# Patient Record
Sex: FEMALE | Race: WHITE | Employment: FULL TIME | URBAN - METROPOLITAN AREA
[De-identification: names, ages, dates, MRNs, and addresses within clinical notes are randomized per-mention and may not be internally consistent; named-entity substitution may affect disease eponyms.]

---

## 2017-02-13 ENCOUNTER — TELEPHONE (OUTPATIENT)
Dept: DERMATOLOGY | Age: 32
End: 2017-02-13

## 2017-02-16 ENCOUNTER — OFFICE VISIT (OUTPATIENT)
Dept: DERMATOLOGY | Age: 32
End: 2017-02-16

## 2017-02-16 DIAGNOSIS — L70.0 ACNE VULGARIS: Primary | ICD-10-CM

## 2017-02-16 PROCEDURE — 99213 OFFICE O/P EST LOW 20 MIN: CPT | Performed by: DERMATOLOGY

## 2017-02-16 RX ORDER — DOXYCYCLINE HYCLATE 100 MG
TABLET ORAL
Qty: 60 TABLET | Refills: 3 | Status: SHIPPED | OUTPATIENT
Start: 2017-02-16 | End: 2017-04-18 | Stop reason: SDUPTHER

## 2017-02-16 RX ORDER — CLINDAMYCIN PHOSPHATE 11.9 MG/ML
SOLUTION TOPICAL
Qty: 60 ML | Refills: 4 | Status: SHIPPED | OUTPATIENT
Start: 2017-02-16 | End: 2017-12-05 | Stop reason: SDUPTHER

## 2017-02-23 ENCOUNTER — TELEPHONE (OUTPATIENT)
Dept: DERMATOLOGY | Age: 32
End: 2017-02-23

## 2017-04-18 ENCOUNTER — OFFICE VISIT (OUTPATIENT)
Dept: DERMATOLOGY | Age: 32
End: 2017-04-18

## 2017-04-18 DIAGNOSIS — L70.0 ACNE VULGARIS: Primary | ICD-10-CM

## 2017-04-18 PROCEDURE — 99213 OFFICE O/P EST LOW 20 MIN: CPT | Performed by: DERMATOLOGY

## 2017-04-18 RX ORDER — DOXYCYCLINE HYCLATE 100 MG
TABLET ORAL
Qty: 60 TABLET | Refills: 3 | Status: SHIPPED | OUTPATIENT
Start: 2017-04-18 | End: 2017-08-15 | Stop reason: SDUPTHER

## 2017-04-18 RX ORDER — NORETHINDRONE ACETATE AND ETHINYL ESTRADIOL, ETHINYL ESTRADIOL AND FERROUS FUMARATE 1MG-10(24)
KIT ORAL
COMMUNITY
Start: 2017-04-06 | End: 2020-10-27

## 2017-08-15 ENCOUNTER — OFFICE VISIT (OUTPATIENT)
Dept: DERMATOLOGY | Age: 32
End: 2017-08-15

## 2017-08-15 DIAGNOSIS — L70.0 ACNE VULGARIS: Primary | ICD-10-CM

## 2017-08-15 PROCEDURE — 99213 OFFICE O/P EST LOW 20 MIN: CPT | Performed by: DERMATOLOGY

## 2017-08-15 RX ORDER — DOXYCYCLINE HYCLATE 100 MG
TABLET ORAL
Qty: 60 TABLET | Refills: 3 | Status: SHIPPED | OUTPATIENT
Start: 2017-08-15 | End: 2017-12-05 | Stop reason: SDUPTHER

## 2017-12-05 ENCOUNTER — OFFICE VISIT (OUTPATIENT)
Dept: DERMATOLOGY | Age: 32
End: 2017-12-05

## 2017-12-05 DIAGNOSIS — L70.0 ACNE VULGARIS: Primary | ICD-10-CM

## 2017-12-05 DIAGNOSIS — L24.9 IRRITANT DERMATITIS: ICD-10-CM

## 2017-12-05 PROCEDURE — 99213 OFFICE O/P EST LOW 20 MIN: CPT | Performed by: DERMATOLOGY

## 2017-12-05 RX ORDER — CLINDAMYCIN PHOSPHATE 11.9 MG/ML
SOLUTION TOPICAL
Qty: 60 ML | Refills: 5 | Status: SHIPPED | OUTPATIENT
Start: 2017-12-05 | End: 2018-10-23 | Stop reason: SDUPTHER

## 2017-12-05 RX ORDER — DOXYCYCLINE HYCLATE 100 MG
TABLET ORAL
Qty: 60 TABLET | Refills: 5 | Status: SHIPPED | OUTPATIENT
Start: 2017-12-05 | End: 2018-05-01 | Stop reason: SDUPTHER

## 2017-12-05 NOTE — PROGRESS NOTES
[facility]) Dermatology  Nadia Bender M.D.  [phone]       [facility]  1985    27 y.o. female     Date of Visit: 12/5/2017    Chief Complaint:   Chief Complaint   Patient presents with    Follow-up     4 month follow up for acne         I was asked to see this patient by Dr. Crowe ref. provider found. History of Present Illness:  1. Patient today for follow-up of acne. She has been on doxycycline since February 2017, initially at 100 mg p.o. b.i.d. followed by 100 mg p.o. q. day. Did develop photo onycholysis of fingernails in September which resolved without treatment. Otherwise denies side effects. Has had good improvement of her acne-still has occasional outbreaks. Last week was on vacation and did not use her medicine consistently and did develop a flare. Uses clindamycin solution in the morning and tretinoin 0.025% cream q.h.s. Does wash with a benzoyl peroxide wash in the shower. Uses Cetaphil products    New erythema and scale with pruritus left dorsal fourth finger underneath wedding ring. Prior acne treatments: Minocycline, tretinoin, BenzaClin, clindamycin solution, Keflex     No personal history of skin cancer or dysplastic nevi.  Sister with an unknown type of skin cancer in [place]    Review of Systems:  Constitutional: Reports general sense of well-being       Past Medical History, Surgical History, Family History, Medications and Allergies reviewed. Social History:   Social History     Social History    Marital status:      Spouse name: N/A    Number of children: N/A    Years of education: N/A     Occupational History    Not on file.      Social History Main Topics    Smoking status: Former Smoker     Years: 5.00    Smokeless tobacco: Never Used    Alcohol use Not on file    Drug use: Unknown    Sexual activity: Not on file     Other Topics Concern    Not on file     Social History Narrative    No narrative on file       Physical Examination

## 2018-05-01 ENCOUNTER — OFFICE VISIT (OUTPATIENT)
Dept: DERMATOLOGY | Age: 33
End: 2018-05-01

## 2018-05-01 DIAGNOSIS — L70.0 ACNE VULGARIS: Primary | ICD-10-CM

## 2018-05-01 DIAGNOSIS — D22.9 BENIGN NEVUS: ICD-10-CM

## 2018-05-01 PROCEDURE — 99213 OFFICE O/P EST LOW 20 MIN: CPT | Performed by: DERMATOLOGY

## 2018-05-01 RX ORDER — DOXYCYCLINE HYCLATE 100 MG
TABLET ORAL
Qty: 60 TABLET | Refills: 5 | Status: SHIPPED | OUTPATIENT
Start: 2018-05-01 | End: 2018-05-01 | Stop reason: SDUPTHER

## 2018-05-01 RX ORDER — DOXYCYCLINE HYCLATE 100 MG
100 TABLET ORAL
COMMUNITY
End: 2018-10-23

## 2018-10-23 ENCOUNTER — OFFICE VISIT (OUTPATIENT)
Dept: DERMATOLOGY | Age: 33
End: 2018-10-23
Payer: COMMERCIAL

## 2018-10-23 DIAGNOSIS — L70.0 ACNE VULGARIS: Primary | ICD-10-CM

## 2018-10-23 PROCEDURE — 99213 OFFICE O/P EST LOW 20 MIN: CPT | Performed by: DERMATOLOGY

## 2018-10-23 RX ORDER — CLINDAMYCIN PHOSPHATE 11.9 MG/ML
SOLUTION TOPICAL
Qty: 60 ML | Refills: 5 | Status: SHIPPED | OUTPATIENT
Start: 2018-10-23 | End: 2020-10-27

## 2018-10-23 RX ORDER — PNV NO.118/IRON FUMARATE/FA 29 MG-1 MG
1 TABLET,CHEWABLE ORAL
COMMUNITY
End: 2020-10-27

## 2018-10-23 RX ORDER — CEPHALEXIN 500 MG/1
CAPSULE ORAL
Qty: 60 CAPSULE | Refills: 3 | Status: SHIPPED | OUTPATIENT
Start: 2018-10-23 | End: 2020-10-27

## 2020-08-25 ENCOUNTER — TELEPHONE (OUTPATIENT)
Dept: DERMATOLOGY | Age: 35
End: 2020-08-25

## 2020-08-26 ENCOUNTER — TELEPHONE (OUTPATIENT)
Dept: DERMATOLOGY | Age: 35
End: 2020-08-26

## 2020-08-26 NOTE — TELEPHONE ENCOUNTER
Pt called office  Pt c/b [phone]  Pt states:   - she had called yesterday and no call was returned  Please call to discuss thanks     Pt venecia and on wait list

## 2020-10-27 ENCOUNTER — OFFICE VISIT (OUTPATIENT)
Dept: DERMATOLOGY | Age: 35
End: 2020-10-27
Payer: COMMERCIAL

## 2020-10-27 PROCEDURE — 99213 OFFICE O/P EST LOW 20 MIN: CPT | Performed by: DERMATOLOGY

## 2020-10-27 RX ORDER — CLINDAMYCIN AND BENZOYL PEROXIDE 10; 50 MG/G; MG/G
GEL TOPICAL
Qty: 50 G | Refills: 4 | Status: SHIPPED | OUTPATIENT
Start: 2020-10-27 | End: 2020-10-29

## 2020-10-27 RX ORDER — CICLOPIROX 7.7 MG/G
GEL TOPICAL
Qty: 30 G | Refills: 4 | Status: SHIPPED | OUTPATIENT
Start: 2020-10-27 | End: 2022-10-17

## 2020-10-27 RX ORDER — SPIRONOLACTONE 25 MG/1
TABLET ORAL
Qty: 180 TABLET | Refills: 6 | Status: SHIPPED | OUTPATIENT
Start: 2020-10-27 | End: 2020-10-29

## 2020-10-27 NOTE — PROGRESS NOTES
on file    Drug use: Not on file    Sexual activity: Not on file   Lifestyle    Physical activity     Days per week: Not on file     Minutes per session: Not on file    Stress: Not on file   Relationships    Social connections     Talks on phone: Not on file     Gets together: Not on file     Attends Adventism service: Not on file     Active member of club or organization: Not on file     Attends meetings of clubs or organizations: Not on file     Relationship status: Not on file    Intimate partner violence     Fear of current or ex partner: Not on file     Emotionally abused: Not on file     Physically abused: Not on file     Forced sexual activity: Not on file   Other Topics Concern    Not on file   Social History Narrative    Not on file       Physical Examination       -General: Well-appearing, NAD  1. Well-developed well-nourished  2-3+ inflammatory acne with cysts chin, jawline. 1 resolving cyst forehead  Right great toenail with distal thickening and subungual debris-fissure FPC to distal nail fold      Assessment and Plan     1. Acne vulgaris-reviewed treatment options including Accutane and spironolactone. Patient wishes to proceed with spironolactone-slowly increased to 75 mg p.o. twice daily over the next 2 weeks. Start BenzaClin gel nightly. Potassium check after she has been on 75 mg p.o. twice daily for a month. Reviewed side effects, contraindications-hormonal side effects, hypotension, hyperkalemia   2.  Onychomycosis -start ciclopirox gel twice daily-discussed the need for ongoing treatment as her nail grows     Follow-up 3 months

## 2020-10-28 ENCOUNTER — TELEPHONE (OUTPATIENT)
Dept: DERMATOLOGY | Age: 35
End: 2020-10-28

## 2020-10-28 NOTE — TELEPHONE ENCOUNTER
Patient is requesting a return call. Patient states Dr Juan Antonio Colon gave her 2 options yesterday and patient has decided to go with the accutane. Please advise. Thank you!

## 2020-10-29 ENCOUNTER — OFFICE VISIT (OUTPATIENT)
Dept: DERMATOLOGY | Age: 35
End: 2020-10-29
Payer: COMMERCIAL

## 2020-10-29 VITALS
TEMPERATURE: 97.4 F | WEIGHT: 162.2 LBS | DIASTOLIC BLOOD PRESSURE: 83 MMHG | SYSTOLIC BLOOD PRESSURE: 127 MMHG | HEART RATE: 97 BPM

## 2020-10-29 LAB
CONTROL: NORMAL
PREGNANCY TEST URINE, POC: NEGATIVE

## 2020-10-29 PROCEDURE — 81025 URINE PREGNANCY TEST: CPT | Performed by: DERMATOLOGY

## 2020-10-29 PROCEDURE — 99213 OFFICE O/P EST LOW 20 MIN: CPT | Performed by: DERMATOLOGY

## 2020-10-29 RX ORDER — NORGESTIMATE AND ETHINYL ESTRADIOL 7DAYSX3 28
1 KIT ORAL DAILY
Qty: 28 TABLET | Refills: 6 | Status: SHIPPED | OUTPATIENT
Start: 2020-10-29 | End: 2021-03-16

## 2020-10-29 NOTE — PROGRESS NOTES
[facility]) Dermatology  Leandro Saunders M.D.  [phone]       Mara Chan  1985    38 y.o. female     Date of Visit: 10/29/2020    Chief Complaint:   Chief Complaint   Patient presents with    Acne     accutane        I was asked to see this patient by Dr. Crowe ref. provider found. History of Present Illness:  1. Patient presents today for initiation of isotretinoin-considered spironolactone and BenzaClin but has decided to proceed with isotretinoin. Spoke with her sister who also completed isotretinoin.  has had a vasectomy    Patient will also start an oral contraceptive for the duration of her isotretinoin course. Has been on oral contraceptives previously-no history of hypertension or blood clots    No history of depression, inflammatory bowel disease    No family history of depression or inflammatory bowel disease    Prior acne treatments: Minocycline, tretinoin, BenzaClin, clindamycin solution, Keflex     No personal history of skin cancer or dysplastic nevi.  Sister with an unknown type of skin cancer in [place]      Review of Systems:  Constitutional: Reports general sense of well-being       Past Medical History, Surgical History, Family History, Medications and Allergies reviewed.     Social History:   Social History     Socioeconomic History    Marital status:      Spouse name: Not on file    Number of children: Not on file    Years of education: Not on file    Highest education level: Not on file   Occupational History    Not on file   Social Needs    Financial resource strain: Not on file    Food insecurity     Worry: Not on file     Inability: Not on file    Transportation needs     Medical: Not on file     Non-medical: Not on file   Tobacco Use    Smoking status: Former Smoker     Years: 5.00    Smokeless tobacco: Never Used   Substance and Sexual Activity    Alcohol use: Not on file    Drug use: Not on file    Sexual activity: Not on file   Lifestyle patient will report any such changes in mood, depressive symptoms or suicidal thoughts, headaches, joint or bone pains. Female patients MUST use two simultaneous methods of family planning. Accutane is Category X for pregnancy, meaning it will cause fetal teratogenic malformations, and pregnancy MUST be avoided while on this drug. The dose is 0.5-1 mg per kg in two divided doses for 15-20 weeks. After discussion of these important issues, she indicates complete understanding of all of the above, and does wish to proceed with accutane therapy.

## 2020-12-01 ENCOUNTER — OFFICE VISIT (OUTPATIENT)
Dept: DERMATOLOGY | Age: 35
End: 2020-12-01
Payer: COMMERCIAL

## 2020-12-01 ENCOUNTER — HOSPITAL ENCOUNTER (OUTPATIENT)
Age: 35
Discharge: HOME | End: 2020-12-01
Payer: COMMERCIAL

## 2020-12-01 VITALS — TEMPERATURE: 97.5 F | WEIGHT: 155 LBS

## 2020-12-01 LAB
ALBUMIN SERPL-MCNC: 4.7 G/DL (ref 3.4–5)
ALP BLD-CCNC: 43 U/L (ref 40–129)
ALT SERPL-CCNC: 8 U/L (ref 10–40)
ANION GAP SERPL CALCULATED.3IONS-SCNC: 16 MMOL/L (ref 3–16)
AST SERPL-CCNC: 16 U/L (ref 15–37)
BILIRUB SERPL-MCNC: 0.3 MG/DL (ref 0–1)
BILIRUBIN DIRECT: <0.2 MG/DL (ref 0–0.3)
BILIRUBIN, INDIRECT: ABNORMAL MG/DL (ref 0–1)
BUN BLDV-MCNC: 11 MG/DL (ref 7–20)
CALCIUM SERPL-MCNC: 9.4 MG/DL (ref 8.3–10.6)
CHLORIDE BLD-SCNC: 108 MMOL/L (ref 99–110)
CHOLESTEROL, TOTAL: 185 MG/DL (ref 0–199)
CO2: 23 MMOL/L (ref 21–32)
CONTROL: NORMAL
CREAT SERPL-MCNC: 0.8 MG/DL (ref 0.6–1.1)
GFR AFRICAN AMERICAN: >60
GFR NON-AFRICAN AMERICAN: >60
GLUCOSE BLD-MCNC: 88 MG/DL (ref 70–99)
HCG QUALITATIVE: NEGATIVE
HCT VFR BLD CALC: 37 % (ref 36–48)
HDLC SERPL-MCNC: 72 MG/DL (ref 40–60)
HEMOGLOBIN: 12 G/DL (ref 12–16)
LDL CHOLESTEROL CALCULATED: 101 MG/DL
MCH RBC QN AUTO: 27.9 PG (ref 26–34)
MCHC RBC AUTO-ENTMCNC: 32.5 G/DL (ref 31–36)
MCV RBC AUTO: 85.7 FL (ref 80–100)
PDW BLD-RTO: 14.9 % (ref 12.4–15.4)
PLATELET # BLD: 213 K/UL (ref 135–450)
PMV BLD AUTO: 11.5 FL (ref 5–10.5)
POTASSIUM SERPL-SCNC: 4 MMOL/L (ref 3.5–5.1)
PREGNANCY TEST URINE, POC: NORMAL
RBC # BLD: 4.32 M/UL (ref 4–5.2)
SODIUM BLD-SCNC: 147 MMOL/L (ref 136–145)
TOTAL PROTEIN: 7.3 G/DL (ref 6.4–8.2)
TRIGL SERPL-MCNC: 58 MG/DL (ref 0–150)
VLDLC SERPL CALC-MCNC: 12 MG/DL
WBC # BLD: 5.8 K/UL (ref 4–11)

## 2020-12-01 PROCEDURE — 36415 COLL VENOUS BLD VENIPUNCTURE: CPT

## 2020-12-01 PROCEDURE — 99213 OFFICE O/P EST LOW 20 MIN: CPT | Performed by: DERMATOLOGY

## 2020-12-01 PROCEDURE — 85027 COMPLETE CBC AUTOMATED: CPT

## 2020-12-01 PROCEDURE — 80061 LIPID PANEL: CPT

## 2020-12-01 PROCEDURE — 80076 HEPATIC FUNCTION PANEL: CPT

## 2020-12-01 PROCEDURE — 80048 BASIC METABOLIC PNL TOTAL CA: CPT

## 2020-12-01 PROCEDURE — 81025 URINE PREGNANCY TEST: CPT | Performed by: DERMATOLOGY

## 2020-12-01 PROCEDURE — 84703 CHORIONIC GONADOTROPIN ASSAY: CPT

## 2020-12-01 NOTE — PROGRESS NOTES
Smokeless tobacco: Never Used   Substance and Sexual Activity    Alcohol use: Not on file    Drug use: Not on file    Sexual activity: Not on file   Lifestyle    Physical activity     Days per week: Not on file     Minutes per session: Not on file    Stress: Not on file   Relationships    Social connections     Talks on phone: Not on file     Gets together: Not on file     Attends Tenriism service: Not on file     Active member of club or organization: Not on file     Attends meetings of clubs or organizations: Not on file     Relationship status: Not on file    Intimate partner violence     Fear of current or ex partner: Not on file     Emotionally abused: Not on file     Physically abused: Not on file     Forced sexual activity: Not on file   Other Topics Concern    Not on file   Social History Narrative    Not on file       Physical Examination       -General: Well-appearing, NAD  2-3+ active inflammatory acne around her mouth, jawline. Early pitted scarring. Postinflammatory erythema. Assessment and Plan     1. Acne vulgaris-discussed expected improvement of isotretinoin and expected time course. 2. On isotretinoin therapy - Accutane is discussed fully with the patient. It is a very effective drug to treat acne vulgaris but has many significant side effects. Chief among these are teratogenesis, hepatic injury, dyslipidemia and severe drying of the mucous membranes. All of these issues have been discussed in details. Monthly blood tests to monitor lipids and liver functions will be necessary. Expect painful dryness and/or fissuring around the lips, eyes, and other moist areas of the body. Balms may be protective. Contact lens may be too painful to wear temporarily while on this drug. Episodes of significant depression have been reported, including suicidal ideation and attempts in rare cases. It may also cause pseudotumor cerebri and hyperostosis.  The patient will report any such changes in mood, depressive symptoms or suicidal thoughts, headaches, joint or bone pains. Female patients MUST use two simultaneous methods of family planning. Accutane is Category X for pregnancy, meaning it will cause fetal teratogenic malformations, and pregnancy MUST be avoided while on this drug. The dose is 0.5-1 mg per kg in two divided doses for 15-20 weeks. After discussion of these important issues, she indicates complete understanding of all of the above, and does wish to proceed with accutane therapy. Pending lab results-initiate isotretinoin at 40 mg p.o. daily for the first month with repeat labs. Goal dose of 80 mg p.o. daily.

## 2020-12-02 RX ORDER — ISOTRETINOIN 40 MG/1
CAPSULE ORAL
Qty: 30 CAPSULE | Refills: 0 | Status: SHIPPED | OUTPATIENT
Start: 2020-12-02 | End: 2021-01-05 | Stop reason: SDUPTHER

## 2021-01-04 ENCOUNTER — HOSPITAL ENCOUNTER (OUTPATIENT)
Age: 36
Discharge: HOME | End: 2021-01-04
Payer: COMMERCIAL

## 2021-01-04 DIAGNOSIS — L70.0 ACNE VULGARIS: ICD-10-CM

## 2021-01-04 LAB
ALBUMIN SERPL-MCNC: 4.7 G/DL (ref 3.4–5)
ALP BLD-CCNC: 47 U/L (ref 40–129)
ALT SERPL-CCNC: 23 U/L (ref 10–40)
AST SERPL-CCNC: 25 U/L (ref 15–37)
BILIRUB SERPL-MCNC: 0.4 MG/DL (ref 0–1)
BILIRUBIN DIRECT: <0.2 MG/DL (ref 0–0.3)
BILIRUBIN, INDIRECT: NORMAL MG/DL (ref 0–1)
CHOLESTEROL, TOTAL: 184 MG/DL (ref 0–199)
HCG QUALITATIVE: NEGATIVE
HCT VFR BLD CALC: 37.8 % (ref 36–48)
HDLC SERPL-MCNC: 70 MG/DL (ref 40–60)
HEMOGLOBIN: 12.3 G/DL (ref 12–16)
LDL CHOLESTEROL CALCULATED: 95 MG/DL
MCH RBC QN AUTO: 27.5 PG (ref 26–34)
MCHC RBC AUTO-ENTMCNC: 32.6 G/DL (ref 31–36)
MCV RBC AUTO: 84.3 FL (ref 80–100)
PDW BLD-RTO: 13.9 % (ref 12.4–15.4)
PLATELET # BLD: 191 K/UL (ref 135–450)
PLATELET SLIDE REVIEW: ADEQUATE
PMV BLD AUTO: 11.3 FL (ref 5–10.5)
RBC # BLD: 4.49 M/UL (ref 4–5.2)
TOTAL PROTEIN: 7.3 G/DL (ref 6.4–8.2)
TRIGL SERPL-MCNC: 94 MG/DL (ref 0–150)
VLDLC SERPL CALC-MCNC: 19 MG/DL
WBC # BLD: 7 K/UL (ref 4–11)

## 2021-01-04 PROCEDURE — 84703 CHORIONIC GONADOTROPIN ASSAY: CPT

## 2021-01-04 PROCEDURE — 36415 COLL VENOUS BLD VENIPUNCTURE: CPT

## 2021-01-04 PROCEDURE — 80061 LIPID PANEL: CPT

## 2021-01-04 PROCEDURE — 85027 COMPLETE CBC AUTOMATED: CPT

## 2021-01-04 PROCEDURE — 80076 HEPATIC FUNCTION PANEL: CPT

## 2021-01-05 ENCOUNTER — OFFICE VISIT (OUTPATIENT)
Dept: DERMATOLOGY | Age: 36
End: 2021-01-05
Payer: COMMERCIAL

## 2021-01-05 VITALS — WEIGHT: 157 LBS | TEMPERATURE: 97.7 F

## 2021-01-05 DIAGNOSIS — L70.0 ACNE VULGARIS: Primary | ICD-10-CM

## 2021-01-05 DIAGNOSIS — Z79.899 ON ISOTRETINOIN THERAPY: ICD-10-CM

## 2021-01-05 PROCEDURE — 99214 OFFICE O/P EST MOD 30 MIN: CPT | Performed by: DERMATOLOGY

## 2021-01-05 RX ORDER — ISOTRETINOIN 40 MG/1
CAPSULE ORAL
Qty: 60 CAPSULE | Refills: 0 | Status: SHIPPED | OUTPATIENT
Start: 2021-01-05 | End: 2021-02-02 | Stop reason: SDUPTHER

## 2021-01-05 NOTE — PROGRESS NOTES
[facility]) Dermatology  Efren Chavez M.D.  [phone]       Phil Castro  1985    38 y.o. female     Date of Visit: 1/5/2021    Chief Complaint:   Chief Complaint   Patient presents with    Acne     accutane check         I was asked to see this patient by Dr. Crowe ref. provider found. History of Present Illness:  1. Patient presents today for follow-up of isotretinoin    Patient has completed 1 month of isotretinoin 40 mg p.o. daily. Tolerated this without difficulty. Mild xerosis of skin. She did have an acne flare when initiating isotretinoin. Acne flare is improving currently    Isotretinoin Symptom Survey:  Dry lips: Yes   Dry eyes: No   Dry skin: Yes   Muscle aches or Pains: No  Nose bleeds: No   Frequent Headaches: No   Mood swings: No   Depression: No   Suicidal thoughts: No   Rash: No   Trouble with night vision: No   Severe sun sensitivity or sunburn: No       Skin History:   has had a vasectomy     Patient + oral contraceptive for the duration of her isotretinoin course.  Has been on oral contraceptives previously-no history of hypertension or blood clots     Contraception: 1. Oral contraceptive 2.  with vasectomy     No history of depression, inflammatory bowel disease     No family history of depression or inflammatory bowel disease     Prior acne treatments: Minocycline, tretinoin, BenzaClin, clindamycin solution, Keflex     No personal history of skin cancer or dysplastic nevi.  Sister with an unknown type of skin cancer in [place]    She will avoid alcohol while on isotretinoin      Review of Systems:  Constitutional: Reports general sense of well-being       Past Medical History, Surgical History, Family History, Medications and Allergies reviewed.     Social History:   Social History     Socioeconomic History    Marital status:      Spouse name: Not on file    Number of children: Not on file    Years of education: Not on file    Highest education areas of the body. Balms may be protective. Contact lens may be too painful to wear temporarily while on this drug. Episodes of significant depression have been reported, including suicidal ideation and attempts in rare cases. It may also cause pseudotumor cerebri and hyperostosis. The patient will report any such changes in mood, depressive symptoms or suicidal thoughts, headaches, joint or bone pains. Female patients MUST use two simultaneous methods of family planning. Accutane is Category X for pregnancy, meaning it will cause fetal teratogenic malformations, and pregnancy MUST be avoided while on this drug. The dose is 0.5-1 mg per kg in two divided doses for 15-20 weeks. After discussion of these important issues, she indicates complete understanding of all of the above, and does wish to proceed with accutane therapy.     Increase to isotretinoin 80 mg p.o. daily

## 2021-02-01 ENCOUNTER — HOSPITAL ENCOUNTER (OUTPATIENT)
Age: 36
Discharge: HOME | End: 2021-02-01
Payer: COMMERCIAL

## 2021-02-01 DIAGNOSIS — L70.0 ACNE VULGARIS: ICD-10-CM

## 2021-02-01 LAB
ALBUMIN SERPL-MCNC: 4.4 G/DL (ref 3.4–5)
ALP BLD-CCNC: 46 U/L (ref 40–129)
ALT SERPL-CCNC: 16 U/L (ref 10–40)
ANION GAP SERPL CALCULATED.3IONS-SCNC: 11 MMOL/L (ref 3–16)
AST SERPL-CCNC: 23 U/L (ref 15–37)
BILIRUB SERPL-MCNC: 0.3 MG/DL (ref 0–1)
BILIRUBIN DIRECT: <0.2 MG/DL (ref 0–0.3)
BILIRUBIN, INDIRECT: NORMAL MG/DL (ref 0–1)
BUN BLDV-MCNC: 10 MG/DL (ref 7–20)
CALCIUM SERPL-MCNC: 9.6 MG/DL (ref 8.3–10.6)
CHLORIDE BLD-SCNC: 100 MMOL/L (ref 99–110)
CHOLESTEROL, TOTAL: 184 MG/DL (ref 0–199)
CO2: 25 MMOL/L (ref 21–32)
CREAT SERPL-MCNC: 0.6 MG/DL (ref 0.6–1.1)
GFR AFRICAN AMERICAN: >60
GFR NON-AFRICAN AMERICAN: >60
GLUCOSE BLD-MCNC: 84 MG/DL (ref 70–99)
HCG QUALITATIVE: NEGATIVE
HCT VFR BLD CALC: 37.6 % (ref 36–48)
HDLC SERPL-MCNC: 63 MG/DL (ref 40–60)
HEMOGLOBIN: 12.2 G/DL (ref 12–16)
LDL CHOLESTEROL CALCULATED: 103 MG/DL
MCH RBC QN AUTO: 27.1 PG (ref 26–34)
MCHC RBC AUTO-ENTMCNC: 32.3 G/DL (ref 31–36)
MCV RBC AUTO: 83.9 FL (ref 80–100)
PDW BLD-RTO: 14.9 % (ref 12.4–15.4)
PLATELET # BLD: 183 K/UL (ref 135–450)
PMV BLD AUTO: 11.6 FL (ref 5–10.5)
POTASSIUM SERPL-SCNC: 4.2 MMOL/L (ref 3.5–5.1)
RBC # BLD: 4.49 M/UL (ref 4–5.2)
SODIUM BLD-SCNC: 136 MMOL/L (ref 136–145)
TOTAL PROTEIN: 7.5 G/DL (ref 6.4–8.2)
TRIGL SERPL-MCNC: 88 MG/DL (ref 0–150)
VLDLC SERPL CALC-MCNC: 18 MG/DL
WBC # BLD: 5.7 K/UL (ref 4–11)

## 2021-02-01 PROCEDURE — 84703 CHORIONIC GONADOTROPIN ASSAY: CPT

## 2021-02-01 PROCEDURE — 85027 COMPLETE CBC AUTOMATED: CPT

## 2021-02-01 PROCEDURE — 80048 BASIC METABOLIC PNL TOTAL CA: CPT

## 2021-02-01 PROCEDURE — 80076 HEPATIC FUNCTION PANEL: CPT

## 2021-02-01 PROCEDURE — 36415 COLL VENOUS BLD VENIPUNCTURE: CPT

## 2021-02-01 PROCEDURE — 80061 LIPID PANEL: CPT

## 2021-02-02 ENCOUNTER — OFFICE VISIT (OUTPATIENT)
Dept: DERMATOLOGY | Age: 36
End: 2021-02-02
Payer: COMMERCIAL

## 2021-02-02 VITALS — TEMPERATURE: 98.6 F

## 2021-02-02 DIAGNOSIS — K13.0 ANGULAR CHEILITIS: ICD-10-CM

## 2021-02-02 DIAGNOSIS — L70.0 ACNE VULGARIS: Primary | ICD-10-CM

## 2021-02-02 DIAGNOSIS — Z79.899 ON ISOTRETINOIN THERAPY: ICD-10-CM

## 2021-02-02 PROCEDURE — 99214 OFFICE O/P EST MOD 30 MIN: CPT | Performed by: DERMATOLOGY

## 2021-02-02 RX ORDER — ISOTRETINOIN 40 MG/1
CAPSULE ORAL
Qty: 60 CAPSULE | Refills: 0 | Status: SHIPPED | OUTPATIENT
Start: 2021-02-02 | End: 2021-03-03 | Stop reason: SDUPTHER

## 2021-02-02 RX ORDER — KETOCONAZOLE 20 MG/G
CREAM TOPICAL
Qty: 15 G | Refills: 0 | Status: SHIPPED | OUTPATIENT
Start: 2021-02-02 | End: 2022-10-17

## 2021-02-02 NOTE — PROGRESS NOTES
[facility]) Dermatology  Bogdan Salguero M.D.  [phone]       Chad Traore  1985    38 y.o. female     Date of Visit: 2/2/2021    Chief Complaint:   Chief Complaint   Patient presents with    Acne     acutane        I was asked to see this patient by Dr. Crowe ref. provider found. History of Present Illness:  1. Patient presents today for follow-up of isotretinoin    She has completed 2 months of isotretinoin, first month at 40 mg p.o. daily and second month at 80 mg p.o. daily. Did flare during her first month of treatment-flare has subsided during her second month and she is now back to baseline. Still has multiple active inflammatory papules and is still developing new cysts. Dryness has been tolerable. Developing fissures in the angles of her oral commissure bilaterally-these are slow to heal intend to recrack every time she opens her mouth.     Isotretinoin Symptom Survey:  Dry lips: Yes   Dry eyes: No   Dry skin: Yes   Muscle aches or Pains: Yes, mild  Nose bleeds: No   Frequent Headaches: No   Mood swings: No   Depression: No   Suicidal thoughts: No   Rash: No   Trouble with night vision: No   Severe sun sensitivity or sunburn: No     Skin History:   has had a vasectomy     Patient + oral contraceptive for the duration of her isotretinoin course.  Has been on oral contraceptives previously-no history of hypertension or blood clots     Contraception: 1.  Oral contraceptive 2.   with vasectomy     No history of depression, inflammatory bowel disease     No family history of depression or inflammatory bowel disease     Prior acne treatments: Minocycline, tretinoin, BenzaClin, clindamycin solution, Keflex     No personal history of skin cancer or dysplastic nevi.  Sister with an unknown type of skin cancer in [place]     She will avoid alcohol while on isotretinoin  Review of Systems:  Constitutional: Reports general sense of well-being       Past Medical History, Surgical History, Family History, Medications and Allergies reviewed. Social History:   Social History     Socioeconomic History    Marital status:      Spouse name: Not on file    Number of children: Not on file    Years of education: Not on file    Highest education level: Not on file   Occupational History    Not on file   Social Needs    Financial resource strain: Not on file    Food insecurity     Worry: Not on file     Inability: Not on file    Transportation needs     Medical: Not on file     Non-medical: Not on file   Tobacco Use    Smoking status: Former Smoker     Years: 5.00    Smokeless tobacco: Never Used   Substance and Sexual Activity    Alcohol use: Not on file    Drug use: Not on file    Sexual activity: Not on file   Lifestyle    Physical activity     Days per week: Not on file     Minutes per session: Not on file    Stress: Not on file   Relationships    Social connections     Talks on phone: Not on file     Gets together: Not on file     Attends Hinduism service: Not on file     Active member of club or organization: Not on file     Attends meetings of clubs or organizations: Not on file     Relationship status: Not on file    Intimate partner violence     Fear of current or ex partner: Not on file     Emotionally abused: Not on file     Physically abused: Not on file     Forced sexual activity: Not on file   Other Topics Concern    Not on file   Social History Narrative    Not on file       Physical Examination       -General: Well-appearing, NAD  1. Limited exam  2-3+ active inflammatory acne chin, lower cheeks with fissuring in her oral commissures bilaterally      Assessment and Plan     1. Acne vulgaris-discussed time course for expected improvement of acne over the next 1 to 2 months   2. On isotretinoin therapy - Accutane is discussed fully with the patient. It is a very effective drug to treat acne vulgaris but has many significant side effects.  Chief among these are teratogenesis, hepatic injury, dyslipidemia and severe drying of the mucous membranes. All of these issues have been discussed in details. Monthly blood tests to monitor lipids and liver functions will be necessary. Expect painful dryness and/or fissuring around the lips, eyes, and other moist areas of the body. Balms may be protective. Contact lens may be too painful to wear temporarily while on this drug. Episodes of significant depression have been reported, including suicidal ideation and attempts in rare cases. It may also cause pseudotumor cerebri and hyperostosis. The patient will report any such changes in mood, depressive symptoms or suicidal thoughts, headaches, joint or bone pains. Female patients MUST use two simultaneous methods of family planning. Accutane is Category X for pregnancy, meaning it will cause fetal teratogenic malformations, and pregnancy MUST be avoided while on this drug. The dose is 0.5-1 mg per kg in two divided doses for 15-20 weeks. After discussion of these important issues, she indicates complete understanding of all of the above, and does wish to proceed with accutane therapy. Continue isotretinoin 80 mg p.o. daily     3. Angular cheilitis-continue Aquaphor. Add ketoconazole 2% cream 4 times a day for the first 3 to 4 days until healed, then as needed.

## 2021-03-03 ENCOUNTER — OFFICE VISIT (OUTPATIENT)
Dept: DERMATOLOGY | Age: 36
End: 2021-03-03
Payer: COMMERCIAL

## 2021-03-03 VITALS — WEIGHT: 157 LBS | TEMPERATURE: 98 F

## 2021-03-03 DIAGNOSIS — K13.0 ANGULAR CHEILITIS: ICD-10-CM

## 2021-03-03 DIAGNOSIS — L70.0 ACNE VULGARIS: Primary | ICD-10-CM

## 2021-03-03 DIAGNOSIS — Z79.899 ON ISOTRETINOIN THERAPY: ICD-10-CM

## 2021-03-03 LAB
CONTROL: NORMAL
PREGNANCY TEST URINE, POC: NORMAL

## 2021-03-03 PROCEDURE — 81025 URINE PREGNANCY TEST: CPT | Performed by: DERMATOLOGY

## 2021-03-03 PROCEDURE — 99214 OFFICE O/P EST MOD 30 MIN: CPT | Performed by: DERMATOLOGY

## 2021-03-03 RX ORDER — TRIAMCINOLONE ACETONIDE 0.25 MG/G
CREAM TOPICAL
Qty: 15 G | Refills: 0 | Status: SHIPPED | OUTPATIENT
Start: 2021-03-03 | End: 2022-10-17

## 2021-03-03 RX ORDER — ISOTRETINOIN 40 MG/1
CAPSULE ORAL
Qty: 60 CAPSULE | Refills: 0 | Status: SHIPPED | OUTPATIENT
Start: 2021-03-03 | End: 2021-04-08 | Stop reason: SDUPTHER

## 2021-03-03 NOTE — PROGRESS NOTES
[facility]) Dermatology  Julia Collado M.D.  [phone]       Lacho Fisher  1985    38 y.o. female     Date of Visit: 3/3/2021    Chief Complaint:   Chief Complaint   Patient presents with    Acne     accutane check         I was asked to see this patient by Dr. Crowe ref. provider found. History of Present Illness:  1. Patient presents today for follow-up of isotretinoin-acne is improving. Still developing occasional inflammatory papules especially on her chin and jawline, but fewer than she has had in the past.  Older lesions are healing. Angular Cheilitis has recurred-resolved after 1 to 2 weeks of ketoconazole 2% cream, then she was outside and the weather was cold and it recurred. Having difficulty getting this fully healed even with using ketoconazole regularly    Isotretinoin Symptom Survey:  Dry lips: Yes-Angular Cheilitis  Dry eyes: No   Dry skin: Yes   Muscle aches or Pains: Yes-especially if she works out aggressively.   Has been limiting workouts to a moderate level  Nose bleeds: Yes-mild  Frequent Headaches: No   Mood swings: No   Depression: No   Suicidal thoughts: No   Rash: No   Trouble with night vision: No   Severe sun sensitivity or sunburn: No           Skin History:   has had a vasectomy     Patient + oral contraceptive for the duration of her isotretinoin course.  Has been on oral contraceptives previously-no history of hypertension or blood clots     Contraception: 1.  Oral contraceptive 2.   with vasectomy     No history of depression, inflammatory bowel disease     No family history of depression or inflammatory bowel disease     Prior acne treatments: Minocycline, tretinoin, BenzaClin, clindamycin solution, Keflex     No personal history of skin cancer or dysplastic nevi.  Sister with an unknown type of skin cancer in [place]    Review of Systems:  Constitutional: Reports general sense of well-being       Past Medical History, Surgical History, Family History, Medications and Allergies reviewed. Social History:   Social History     Socioeconomic History    Marital status:      Spouse name: Not on file    Number of children: Not on file    Years of education: Not on file    Highest education level: Not on file   Occupational History    Not on file   Social Needs    Financial resource strain: Not on file    Food insecurity     Worry: Not on file     Inability: Not on file    Transportation needs     Medical: Not on file     Non-medical: Not on file   Tobacco Use    Smoking status: Former Smoker     Years: 5.00    Smokeless tobacco: Never Used   Substance and Sexual Activity    Alcohol use: Not on file    Drug use: Not on file    Sexual activity: Not on file   Lifestyle    Physical activity     Days per week: Not on file     Minutes per session: Not on file    Stress: Not on file   Relationships    Social connections     Talks on phone: Not on file     Gets together: Not on file     Attends Jew service: Not on file     Active member of club or organization: Not on file     Attends meetings of clubs or organizations: Not on file     Relationship status: Not on file    Intimate partner violence     Fear of current or ex partner: Not on file     Emotionally abused: Not on file     Physically abused: Not on file     Forced sexual activity: Not on file   Other Topics Concern    Not on file   Social History Narrative    Not on file       Physical Examination       -General: Well-appearing, NAD  1. Limited exam  1-2+ inflammatory acne jawline and chin-multiple healing lesions  Urine pregnancy test negative    Assessment and Plan     1. Acne vulgaris-discussed natural history of improvement of acne-expect ongoing improvement over the next month of treatment   2. On isotretinoin therapy - Accutane is discussed fully with the patient. It is a very effective drug to treat acne vulgaris but has many significant side effects.  Chief among these are teratogenesis, hepatic injury, dyslipidemia and severe drying of the mucous membranes. All of these issues have been discussed in details. Monthly blood tests to monitor lipids and liver functions will be necessary. Expect painful dryness and/or fissuring around the lips, eyes, and other moist areas of the body. Balms may be protective. Contact lens may be too painful to wear temporarily while on this drug. Episodes of significant depression have been reported, including suicidal ideation and attempts in rare cases. It may also cause pseudotumor cerebri and hyperostosis. The patient will report any such changes in mood, depressive symptoms or suicidal thoughts, headaches, joint or bone pains. Female patients MUST use two simultaneous methods of family planning. Accutane is Category X for pregnancy, meaning it will cause fetal teratogenic malformations, and pregnancy MUST be avoided while on this drug. The dose is 0.5-1 mg per kg in two divided doses for 15-20 weeks. After discussion of these important issues, she indicates complete understanding of all of the above, and does wish to proceed with accutane therapy. Continue isotretinoin 80 mg p.o. daily       3. Angular cheilitis-add triamcinolone 0.025% cream twice daily and continue ketoconazole 2% cream 2-4 times daily. Discussed using the triamcinolone for only 1 week.   Reviewed proper use and side effects

## 2021-03-16 RX ORDER — NORGESTIMATE AND ETHINYL ESTRADIOL 7DAYSX3 28
KIT ORAL
Qty: 84 TABLET | Refills: 2 | Status: SHIPPED | OUTPATIENT
Start: 2021-03-16 | End: 2022-10-17

## 2021-04-06 ENCOUNTER — VIRTUAL VISIT (OUTPATIENT)
Dept: DERMATOLOGY | Age: 36
End: 2021-04-06

## 2021-04-06 DIAGNOSIS — L85.3 ASTEATOSIS: ICD-10-CM

## 2021-04-06 DIAGNOSIS — K13.0 ANGULAR CHEILITIS: ICD-10-CM

## 2021-04-06 DIAGNOSIS — Z79.899 ON ISOTRETINOIN THERAPY: ICD-10-CM

## 2021-04-06 DIAGNOSIS — L70.0 ACNE VULGARIS: Primary | ICD-10-CM

## 2021-04-08 RX ORDER — ISOTRETINOIN 40 MG/1
CAPSULE ORAL
Qty: 60 CAPSULE | Refills: 0 | Status: SHIPPED | OUTPATIENT
Start: 2021-04-08 | End: 2022-05-10

## 2021-05-18 ENCOUNTER — OFFICE VISIT (OUTPATIENT)
Dept: DERMATOLOGY | Age: 36
End: 2021-05-18
Payer: COMMERCIAL

## 2021-05-18 VITALS — WEIGHT: 152.8 LBS | TEMPERATURE: 99 F

## 2021-05-18 DIAGNOSIS — L70.0 ACNE VULGARIS: Primary | ICD-10-CM

## 2021-05-18 DIAGNOSIS — Z79.899 ON ISOTRETINOIN THERAPY: ICD-10-CM

## 2021-05-18 LAB
CONTROL: NORMAL
PREGNANCY TEST URINE, POC: NEGATIVE

## 2021-05-18 PROCEDURE — 81025 URINE PREGNANCY TEST: CPT | Performed by: DERMATOLOGY

## 2021-05-18 PROCEDURE — 99214 OFFICE O/P EST MOD 30 MIN: CPT | Performed by: DERMATOLOGY

## 2021-05-18 NOTE — PROGRESS NOTES
Tobacco Use    Smoking status: Former Smoker     Years: 5.00    Smokeless tobacco: Never Used   Vaping Use    Vaping Use: Never used   Substance and Sexual Activity    Alcohol use: Not on file    Drug use: Not on file    Sexual activity: Not on file   Other Topics Concern    Not on file   Social History Narrative    Not on file     Social Determinants of Health     Financial Resource Strain:     Difficulty of Paying Living Expenses:    Food Insecurity:     Worried About Running Out of Food in the Last Year:     [address] in the Last Year:    Transportation Needs:     Lack of Transportation (Medical):  Lack of Transportation (Non-Medical):    Physical Activity:     Days of Exercise per Week:     Minutes of Exercise per Session:    Stress:     Feeling of Stress :    Social Connections:     Frequency of Communication with Friends and Family:     Frequency of Social Gatherings with Friends and Family:     Attends Gnosticist Services:     Active Member of Clubs or Organizations:     Attends Club or Organization Meetings:     Marital Status:    Intimate Partner Violence:     Fear of Current or Ex-Partner:     Emotionally Abused:     Physically Abused:     Sexually Abused:        Physical Examination       -General: Well-appearing, NAD  1. Limited exam  Scarring, postinflammatory hyperpigmentation, no active acne xerosis of her lips    Negative pregnancy test      Assessment and Plan     1. Acne vulgaris -discussed natural history of improvement of scarring and postinflammatory hyperpigmentation-discussed excellent sunscreen. Recheck acne 3 to 4 months   2. On isotretinoin therapy - Accutane is discussed fully with the patient. It is a very effective drug to treat acne vulgaris but has many significant side effects. Chief among these are teratogenesis, hepatic injury, dyslipidemia and severe drying of the mucous membranes. All of these issues have been discussed in details.  Monthly blood tests to monitor lipids and liver functions will be necessary. Expect painful dryness and/or fissuring around the lips, eyes, and other moist areas of the body. Balms may be protective. Contact lens may be too painful to wear temporarily while on this drug. Episodes of significant depression have been reported, including suicidal ideation and attempts in rare cases. It may also cause pseudotumor cerebri and hyperostosis. The patient will report any such changes in mood, depressive symptoms or suicidal thoughts, headaches, joint or bone pains. Female patients MUST use two simultaneous methods of family planning. Accutane is Category X for pregnancy, meaning it will cause fetal teratogenic malformations, and pregnancy MUST be avoided while on this drug. The dose is 0.5-1 mg per kg in two divided doses for 15-20 weeks. Patient has completed isotretinoin-discussed all precautions which remain in effect for 1 month after her last pill. Continue oral contraceptive for 5 more weeks. Patient will send pregnancy test in 5 weeks.   Recheck 3 to 4 months

## 2021-08-24 ENCOUNTER — OFFICE VISIT (OUTPATIENT)
Dept: DERMATOLOGY | Age: 36
End: 2021-08-24
Payer: COMMERCIAL

## 2021-08-24 ENCOUNTER — TELEPHONE (OUTPATIENT)
Dept: DERMATOLOGY | Age: 36
End: 2021-08-24

## 2021-08-24 VITALS — TEMPERATURE: 97.4 F

## 2021-08-24 DIAGNOSIS — L70.0 ACNE VULGARIS: Primary | ICD-10-CM

## 2021-08-24 DIAGNOSIS — L98.8 RHYTIDES: ICD-10-CM

## 2021-08-24 DIAGNOSIS — Z79.899 ON ISOTRETINOIN THERAPY: ICD-10-CM

## 2021-08-24 LAB
CONTROL: NORMAL
PREGNANCY TEST URINE, POC: NEGATIVE

## 2021-08-24 PROCEDURE — 99213 OFFICE O/P EST LOW 20 MIN: CPT | Performed by: DERMATOLOGY

## 2021-08-24 PROCEDURE — 81025 URINE PREGNANCY TEST: CPT | Performed by: DERMATOLOGY

## 2021-08-24 NOTE — PROGRESS NOTES
[facility]) Dermatology  Pearl Zaragoza M.D.  [phone]       Carol Nowak  1985    43 y.o. female     Date of Visit: 8/24/2021    Chief Complaint:   Chief Complaint   Patient presents with    Acne     3-4 mo f/u-\"better\"        I was asked to see this patient by Dr. Crowe ref. provider found. History of Present Illness:  1. Patient presents today for follow-up of acne-has had 2-3 small inflammatory papules that resolved quickly since completing isotretinoin. No active cysts. Very happy with her improvement. Dryness resolved quickly after completion of isotretinoin. Progressive rhytides forehead- would like to consider treatment options. Skin History:   has had a vasectomy     Patient + oral contraceptive for the duration of her isotretinoin course.  Has been on oral contraceptives previously-no history of hypertension or blood clots     Contraception: 1.  Oral contraceptive 2.   with vasectomy     No history of depression, inflammatory bowel disease     No family history of depression or inflammatory bowel disease     Prior acne treatments: Minocycline, tretinoin, BenzaClin, clindamycin solution, Keflex     No personal history of skin cancer or dysplastic nevi.  Sister with an unknown type of skin cancer in [place]    Review of Systems:  Constitutional: Reports general sense of well-being       Past Medical History, Surgical History, Family History, Medications and Allergies reviewed.     Social History:   Social History     Socioeconomic History    Marital status:      Spouse name: Not on file    Number of children: Not on file    Years of education: Not on file    Highest education level: Not on file   Occupational History    Not on file   Tobacco Use    Smoking status: Former Smoker     Years: 5.00    Smokeless tobacco: Never Used   Vaping Use    Vaping Use: Never used   Substance and Sexual Activity    Alcohol use: Not on file    Drug use: Not on file   [facility] Sexual activity: Not on file   Other Topics Concern    Not on file   Social History Narrative    Not on file     Social Determinants of Health     Financial Resource Strain:     Difficulty of Paying Living Expenses:    Food Insecurity:     Worried About Running Out of Food in the Last Year:     [address] in the Last Year:    Transportation Needs:     Lack of Transportation (Medical):  Lack of Transportation (Non-Medical):    Physical Activity:     Days of Exercise per Week:     Minutes of Exercise per Session:    Stress:     Feeling of Stress :    Social Connections:     Frequency of Communication with Friends and Family:     Frequency of Social Gatherings with Friends and Family:     Attends Denominational Services:     Active Member of Clubs or Organizations:     Attends Club or Organization Meetings:     Marital Status:    Intimate Partner Violence:     Fear of Current or Ex-Partner:     Emotionally Abused:     Physically Abused:     Sexually Abused:        Physical Examination       -General: Well-appearing, NAD  Old scarring no active acne face    Post Accutane pregnancy test sent 6/28/2021    Assessment and Plan     1. Acne vulgaris-remain off isotretinoin. Discussed risk of recurrence. Patient will let me know if acne does recur-discussed statistics and treatment options.    2. Rhytides-refer to Sandro Jordan for consideration of Botox/discussion of management

## 2021-08-24 NOTE — Clinical Note
Patient interested in Botox, scheduled consult, but may want to make time for botox at that visit- forehead.

## 2021-09-20 ENCOUNTER — PROCEDURE VISIT (OUTPATIENT)
Dept: DERMATOLOGY | Age: 36
End: 2021-09-20

## 2021-09-20 VITALS — TEMPERATURE: 98.1 F

## 2021-09-20 DIAGNOSIS — L98.8 RHYTIDES: Primary | ICD-10-CM

## 2021-09-20 PROCEDURE — DM00140 PR DERM ONLY BOTOX INJ LEVEL 5: Performed by: DERMATOLOGY

## 2021-09-20 NOTE — PROGRESS NOTES
[facility] Dermatology  Mary Kaiser MD  [phone]      Blake Ulloa  1985    43 y.o. female     Date of Visit: 9/20/2021    Last seen: Dr. Salina Schilder    Chief Complaint: wrinkles  Chief Complaint   Patient presents with    Procedure     botox     History of Present Illness:    Here for eval/trx of facial wrinkles. Bothered by glabellar 11's and horizontal FH wrinkles. Interested in Botox. No previous treatment. Not pregnant, not breast-feeding, no bleeding disorders and no neuromuscular disorders. Review of Systems:  Gen: Feels well, good sense of health. Past Medical History, Family History, Surgical History, Medications and Allergies reviewed. Outpatient Medications Marked as Taking for the 9/20/21 encounter (Procedure visit) with Rudy Reed MD   Medication Sig Dispense Refill    Multiple Vitamins-Minerals (THERAPEUTIC MULTIVITAMIN-MINERALS) tablet Take 1 tablet by mouth daily       No Known Allergies    No past medical history on file. No past surgical history on file. Physical Examination     Gen, well-appearing    Baseline status at rest and with contraction of glabellar muscles and frontalis  Brows fairly symmetric at baseline              Assessment and Plan     1. Wrinkles  - Discussed expectations. Ed risks/SE including bruising, incomplete correction and asymmetry. Pt consented to trx.   Botox (dil 3 ml in 100 units)   Total 0.6 ml to glabella (0.075  0.15  0.15  0.15  0.075)  Total 0.25 ml to FH (0.05 x 5)  ed no lying flat x 4 hrs   ed no massage of treated areas   ed onset   ed full effects can take up to 10-14 days   ed duration     28 units - 336

## 2021-10-04 ENCOUNTER — OFFICE VISIT (OUTPATIENT)
Dept: DERMATOLOGY | Age: 36
End: 2021-10-04

## 2021-10-04 VITALS — TEMPERATURE: 98.7 F

## 2021-10-04 DIAGNOSIS — L98.8 RHYTIDES: Primary | ICD-10-CM

## 2021-10-04 PROCEDURE — 99999 PR OFFICE/OUTPT VISIT,PROCEDURE ONLY: CPT | Performed by: DERMATOLOGY

## 2021-10-04 NOTE — PROGRESS NOTES
[facility] Dermatology  Eric Real MD  [phone]      Ping Pham  1985    43 y.o. female     Date of Visit: 10/4/2021    Last seen: Dr. Sujey Rangel pt - 2 weeks ago    Chief Complaint: f/u s/p btx  Chief Complaint   Patient presents with    Follow-up     post botox check     History of Present Illness:    Here for f/u s/p 1st trx with botox for facial wrinkles. Bothered by glabellar 11's and horizontal FH wrinkles. Very happy with results but needs more trx for the frontalis - had started very low with units since first trx and still has some signif movement in the lateral lower FH with angled brows. Not pregnant, not breast-feeding, no bleeding disorders and no neuromuscular disorders. Review of Systems:  Gen: Feels well, good sense of health. Past Medical History, Family History, Surgical History, Medications and Allergies reviewed. Outpatient Medications Marked as Taking for the 10/4/21 encounter (Office Visit) with Juan Gonzalez MD   Medication Sig Dispense Refill    Multiple Vitamins-Minerals (THERAPEUTIC MULTIVITAMIN-MINERALS) tablet Take 1 tablet by mouth daily       No Known Allergies    No past medical history on file. No past surgical history on file. Physical Examination     Gen, well-appearing    Baseline         After:            Assessment and Plan     1. Wrinkles  - Discussed expectations. Ed risks/SE including bruising, incomplete correction and asymmetry. Pt consented to trx.   Botox (dil 3 ml in 100 units)   Total 0.1 to lower lateral FH (0.05 to each side)  Total 0.1 ml to upper FH at hairline (0.05 to each side)  Total 0.1 ml to lateral part of corrugators (0.05 to each side)    ed no lying flat x 4 hrs   ed no massage of treated areas   ed onset   ed full effects can take up to 10-14 days   ed duration     NC - f/u

## 2022-01-10 ENCOUNTER — PROCEDURE VISIT (OUTPATIENT)
Dept: DERMATOLOGY | Age: 37
End: 2022-01-10

## 2022-01-10 VITALS — TEMPERATURE: 97.1 F

## 2022-01-10 DIAGNOSIS — L98.8 RHYTIDES: Primary | ICD-10-CM

## 2022-01-10 PROCEDURE — DM00140 PR DERM ONLY BOTOX INJ LEVEL 5: Performed by: DERMATOLOGY

## 2022-01-10 RX ORDER — OMEGA-3S/DHA/EPA/FISH OIL/D3 300MG-1000
400 CAPSULE ORAL DAILY
COMMUNITY

## 2022-01-10 NOTE — PATIENT INSTRUCTIONS
Post-Injection Care:  Do not lie flat or turn upside down x 4 hrs after treatment. Do not massage treated areas for 24-48 hours (avoid clarisonic-type devices). It is OK to apply makeup and wash your face gently. It can take 2-4 days for onset of effects. Full effects can take up to 10-14 days. Call in the next 1-2 weeks if there are any concerns or questions or if you think additional treatment is needed. Bruising can occur but should not interfere with results. Avoid aspirin, ibuprofen before future treatments.     32 dwnft - 384

## 2022-01-10 NOTE — PROGRESS NOTES
[facility] Dermatology  Sven Starks MD  [facility] [address]  1985    43 y.o. female     Date of Visit: 1/10/2022    Last seen: Dr. Skyler Rocha pt - 4 mos ago    Chief Complaint: f/u s/p btx  Chief Complaint   Patient presents with    Procedure     botox      History of Present Illness:    Here for 2nd trx with botox for facial wrinkles. Bothered by glabellar 11's and horizontal FH wrinkles. Very happy with results with 1st trx 4+ mos ago. Needed additional trx for the frontalis - had started very low with units since first trx and still has some signif movement in the lateral lower FH with angled brows. Not pregnant, not breast-feeding, no bleeding disorders and no neuromuscular disorders. Review of Systems:  Gen: Feels well, good sense of health. Past Medical History, Family History, Surgical History, Medications and Allergies reviewed. Outpatient Medications Marked as Taking for the 1/10/22 encounter (Procedure visit) with Osman Jung MD   Medication Sig Dispense Refill    vitamin D3 (CHOLECALCIFEROL) 10 MCG (400 UNIT) TABS tablet Take 400 Units by mouth daily      Multiple Vitamins-Minerals (THERAPEUTIC MULTIVITAMIN-MINERALS) tablet Take 1 tablet by mouth daily       No Known Allergies    No past medical history on file. No past surgical history on file. Physical Examination     Gen, well-appearing    Baseline - similar today but not quite as dynamic        After:            Assessment and Plan     1. Wrinkles  - Discussed expectations. Ed risks/SE including bruising, incomplete correction and asymmetry. Pt consented to trx.   Botox (dil 3 ml in 100 units)   Total 0.6 ml to glabella (0.075  0.15  0.15  0.15  0.075)  Total 0.35 ml to FH (0.05 x 5)  Few extra gtts to tails + upper FH/hairline x 2  ed no lying flat x 4 hrs   ed no massage of treated areas   ed onset   ed full effects can take up to 10-14 days   ed duration     32 units - [address]

## 2022-05-10 ENCOUNTER — PROCEDURE VISIT (OUTPATIENT)
Dept: DERMATOLOGY | Age: 37
End: 2022-05-10

## 2022-05-10 VITALS — TEMPERATURE: 99.5 F

## 2022-05-10 DIAGNOSIS — L98.8 RHYTIDES: Primary | ICD-10-CM

## 2022-05-10 PROCEDURE — DM00140 PR DERM ONLY BOTOX INJ LEVEL 5: Performed by: DERMATOLOGY

## 2022-05-10 NOTE — PROGRESS NOTES
[facility] Dermatology  Charles Buchanan MD  [phone]      Fermin Cason  1985    42 y.o. female     Date of Visit: 5/10/2022    Last seen: Dr. Migue Villagomez pt - 4 mos ago    Chief Complaint: f/u s/p btx  Chief Complaint   Patient presents with    Botox Injection     History of Present Illness:    Here for 3rd trx with botox for facial wrinkles. Bothered by glabellar 11's and horizontal FH wrinkles. Very happy with results with previous trx - last was ~4 mos ago. Needed additional trx for the frontalis after initial trx - had started very low with units since first trx and still has some signif movement in the lateral lower FH with angled brows. Not pregnant, not breast-feeding, no bleeding disorders and no neuromuscular disorders. Review of Systems:  Gen: Feels well, good sense of health. Past Medical History, Family History, Surgical History, Medications and Allergies reviewed. Outpatient Medications Marked as Taking for the 5/10/22 encounter (Procedure visit) with Mariela Mancera MD   Medication Sig Dispense Refill    vitamin D3 (CHOLECALCIFEROL) 10 MCG (400 UNIT) TABS tablet Take 400 Units by mouth daily      TRI FEMYNOR 0.18/0.215/0.25 MG-35 MCG TABS TAKE 1 TABLET BY MOUTH EVERY DAY 84 tablet 2    triamcinolone (KENALOG) 0.025 % cream Apply to corners of lips twice daily for 1 week 15 g 0    ketoconazole (NIZORAL) 2 % cream Apply QID 3-4 days 15 g 0    Ciclopirox (LOPROX) 0.77 % gel Apply to toenail BID 30 g 4    Multiple Vitamins-Minerals (THERAPEUTIC MULTIVITAMIN-MINERALS) tablet Take 1 tablet by mouth daily       No Known Allergies    History reviewed. No pertinent past medical history. History reviewed. No pertinent surgical history. Physical Examination     Gen, well-appearing    Baseline - similar today but not quite as dynamic        After initial low dose to FH:           Assessment and Plan     1. Wrinkles  - Discussed expectations.   Leno Marshall risks/SE including bruising, incomplete correction and asymmetry. Pt consented to trx.   Botox (dil 3 ml in 100 units)   Total 0.6 ml to glabella (0.075  0.15  0.15  0.15  0.075)  Total 0.35 ml to FH (0.05 x 5)  ~1 units to upper FH/hairline (1 injection to each side)  ed no lying flat x 4 hrs   ed no massage of treated areas   ed onset   ed full effects can take up to 10-14 days   ed duration     32 units - 384

## 2022-05-10 NOTE — PATIENT INSTRUCTIONS
Post-Injection Care:  Do not lie flat or turn upside down x 4 hrs after treatment. Do not massage treated areas for 24-48 hours (avoid clarisonic-type devices). It is OK to apply makeup and wash your face gently. It can take 2-4 days for onset of effects. Full effects can take up to 10-14 days. Call in the next 1-2 weeks if there are any concerns or questions or if you think additional treatment is needed. Bruising can occur but should not interfere with results. Avoid aspirin, ibuprofen before future treatments.     384.00

## 2022-09-14 ENCOUNTER — TELEPHONE (OUTPATIENT)
Dept: DERMATOLOGY | Age: 37
End: 2022-09-14

## 2022-10-17 ENCOUNTER — PROCEDURE VISIT (OUTPATIENT)
Dept: DERMATOLOGY | Age: 37
End: 2022-10-17

## 2022-10-17 DIAGNOSIS — L98.8 RHYTIDES: Primary | ICD-10-CM

## 2022-10-17 PROCEDURE — DM00140 PR DERM ONLY BOTOX INJ LEVEL 5: Performed by: DERMATOLOGY

## 2022-10-17 NOTE — PATIENT INSTRUCTIONS
Post-Injection Care:  Do not lie flat or turn upside down x 4 hrs after treatment. Do not massage treated areas for 24-48 hours (avoid clarisonic-type devices). It is OK to apply makeup and wash your face gently. It can take 2-4 days for onset of effects. Full effects can take up to 10-14 days. Call in the next 1-2 weeks if there are any concerns or questions or if you think additional treatment is needed. Bruising can occur but should not interfere with results. Avoid aspirin, ibuprofen before future treatments.      32 rzqzm - 384

## 2022-10-17 NOTE — PROGRESS NOTES
[facility] Dermatology  Isabel Mathews MD  [phone]      Lucrecia Sandovaljose  1985    42 y.o. female     Date of Visit: 10/17/2022    Last seen: Dr. Zoie Woody pt - 4-5 mos ago    Chief Complaint: f/u s/p btx  Chief Complaint   Patient presents with    Procedure     botox       History of Present Illness:    Here for trx with botox for facial wrinkles. Bothered by glabellar 11's and horizontal FH wrinkles. Very happy with results with previous trx - last was ~5 mos ago. Needed additional trx for the frontalis after initial trx - had started very low with units since first trx and still had some signif movement in the lateral lower FH with angled brows. Not pregnant, not breast-feeding, no bleeding disorders and no neuromuscular disorders. Review of Systems:  Gen: Feels well, good sense of health. Past Medical History, Family History, Surgical History, Medications and Allergies reviewed. Outpatient Medications Marked as Taking for the 10/17/22 encounter (Procedure visit) with Regis Felix MD   Medication Sig Dispense Refill    vitamin D3 (CHOLECALCIFEROL) 10 MCG (400 UNIT) TABS tablet Take 400 Units by mouth daily      Multiple Vitamins-Minerals (THERAPEUTIC MULTIVITAMIN-MINERALS) tablet Take 1 tablet by mouth daily       No Known Allergies    History reviewed. No pertinent past medical history. History reviewed. No pertinent surgical history. Physical Examination     Gen, well-appearing    Baseline - similar today but not quite as dynamic        After initial low dose to FH:           Assessment and Plan     1. Wrinkles  - Discussed expectations. Ed risks/SE including bruising, incomplete correction and asymmetry. Pt consented to trx.   Botox (dil 3 ml in 100 units)   Total 0.6 ml to glabella (0.075  0.15  0.15  0.15  0.075)  Total 0.35 ml to FH (0.05 x 5)  ~1 units to upper FH/hairline (1 injection to each side)  ed no lying flat x 4 hrs   ed no massage of treated areas   ed onset   ed full effects can take up to 10-14 days   ed duration     32 units - 384

## 2023-12-27 ENCOUNTER — TELEPHONE (OUTPATIENT)
Dept: DERMATOLOGY | Age: 38
End: 2023-12-27

## 2024-02-15 ENCOUNTER — PROCEDURE VISIT (OUTPATIENT)
Dept: DERMATOLOGY | Age: 39
End: 2024-02-15

## 2024-02-15 DIAGNOSIS — L98.8 RHYTIDES: Primary | ICD-10-CM

## 2024-02-15 NOTE — PROGRESS NOTES
[facility] Dermatology  Neli Duarte MD  [phone]      Annmarie Gaytan  1985    38 y.o. female     Date of Visit: 2/15/2024    Last seen: Dr. Wilkins pt - last seen     Chief Complaint: f/u s/p btx  Chief Complaint   Patient presents with    Procedure       History of Present Illness:    Here for trx with botox for facial wrinkles.    Bothered by glabellar 11's and horizontal FH wrinkles.    Very happy with results with previous trx - last was 2022.    Needed additional trx for the frontalis after initial trx - had started very low with units since first trx and still had some signif movement in the lateral lower FH with angled brows.    Not pregnant, not breast-feeding, no bleeding disorders and no neuromuscular disorders.    Review of Systems:  Gen: Feels well, good sense of health.      Past Medical History, Family History, Surgical History, Medications and Allergies reviewed.    Outpatient Medications Marked as Taking for the 2/15/24 encounter (Procedure visit) with Neli Duarte MD   Medication Sig Dispense Refill    vitamin D3 (CHOLECALCIFEROL) 10 MCG (400 UNIT) TABS tablet Take 1 tablet by mouth daily      Multiple Vitamins-Minerals (THERAPEUTIC MULTIVITAMIN-MINERALS) tablet Take 1 tablet by mouth daily       No Known Allergies    History reviewed. No pertinent past medical history.  No past surgical history on file.    Physical Examination     Gen, well-appearing    Baseline - similar today but not quite as dynamic        After initial low dose to FH:           Assessment and Plan     1.  Wrinkles  - Discussed expectations.  Ed risks/SE including bruising, incomplete correction and asymmetry.  Pt consented to trx.  Botox (dil 3 ml in 100 units)   Total 0.6 ml to glabella (0.075  0.15  0.15  0.15  0.075)  Total 0.35 ml to FH (0.05 x 5)  ~1 units to upper FH/hairline (1 injection to each side)  ed no lying flat x 4 hrs   ed no massage of treated areas   ed onset   ed

## 2024-02-15 NOTE — PATIENT INSTRUCTIONS
Post-Injection Care:  Do not lie flat or turn upside down x 4 hrs after treatment.  Do not massage treated areas for 24-48 hours (avoid clarisonic-type devices).    It is OK to apply makeup and wash your face gently.  It can take 2-4 days for onset of effects.  Full effects can take up to 10-14 days.    Call in the next 1-2 weeks if there are any concerns or questions or if you think additional treatment is needed.    Bruising can occur but should not interfere with results.  Avoid aspirin, ibuprofen before future treatments.       32 units - $448

## 2024-03-11 NOTE — PROGRESS NOTES
Penny Phillips, was evaluated through a synchronous (real-time) audio-video encounter. The patient (or guardian if applicable) is aware that this is a billable service. Verbal consent to proceed has been obtained within the past 12 months. The visit was conducted pursuant to the emergency declaration under the [facility], [address] [place] [address] authority and the [employer] General Act. Patient identification was verified, and a caregiver was present when appropriate. The patient was located in a state where the provider was credentialed to provide care. Total time spent for this encounter: not billed on time    --Cecilia Martini MD on 4/8/2021 at 1:50 PM    An electronic signature was used to authenticate this note. [facility]) Dermatology  Bonny Quiroz M.D.  [phone]       Penny Phillips  1985    38 y.o. female     Date of Visit: 4/6/2021    Chief Complaint:   Chief Complaint   Patient presents with    Acne     [phone] Accutane        I was asked to see this patient by  No ref. provider found. History of Present Illness:  1. Patient presents today for follow-up of isotretinoin. She has completed 17 weeks of isotretinoin, first 4 weeks at 40 mg p.o. daily and subsequent weeks at 80 mg p.o. daily. Acne has subsided since her last visit-good improvement with no new lesions. Older lesions healing. Did have resolution of Angular Cheilitis which has since recurred in the last few days. Had fairly rapid healing with a combination of ketoconazole 2% cream and triamcinolone 0.025% cream.    New xerosis dorsal hands-new in the last week. Using Aquaphor regularly    Currently has upper respiratory symptoms-recent negative Covid test.  Itchy runny eyes, rhinorrhea, cough.   Children also with similar symptoms    Isotretinoin Symptom Survey:  Dry lips: Yes   Dry eyes: Yes-with upper respiratory infection Attends Sabianism service: Not on file     Active member of club or organization: Not on file     Attends meetings of clubs or organizations: Not on file     Relationship status: Not on file    Intimate partner violence     Fear of current or ex partner: Not on file     Emotionally abused: Not on file     Physically abused: Not on file     Forced sexual activity: Not on file   Other Topics Concern    Not on file   Social History Narrative    Not on file       Physical Examination       -General: Well-appearing, NAD  1. Limited exam  Xerosis dorsal hands thenar side  Cough, rhinorrhea, mild conjunctival injection with clear drainage  Resolving acne lesions with no acute lesions chin, jawline      Assessment and Plan     1. Acne vulgaris-discussed expected improvement of acne and resolution of lesions over the next 1 to 2 months. 2. On isotretinoin therapy - Accutane is discussed fully with the patient. It is a very effective drug to treat acne vulgaris but has many significant side effects. Chief among these are teratogenesis, hepatic injury, dyslipidemia and severe drying of the mucous membranes. All of these issues have been discussed in details. Monthly blood tests to monitor lipids and liver functions will be necessary. Expect painful dryness and/or fissuring around the lips, eyes, and other moist areas of the body. Balms may be protective. Contact lens may be too painful to wear temporarily while on this drug. Episodes of significant depression have been reported, including suicidal ideation and attempts in rare cases. It may also cause pseudotumor cerebri and hyperostosis. The patient will report any such changes in mood, depressive symptoms or suicidal thoughts, headaches, joint or bone pains. Female patients MUST use two simultaneous methods of family planning.  Accutane is Category X for pregnancy, meaning it will cause fetal teratogenic malformations, and pregnancy MUST be avoided while on this drug.    The dose is 0.5-1 mg per kg in two divided doses for 15-20 weeks. After discussion of these important issues, she indicates complete understanding of all of the above, and does wish to proceed with accutane therapy. Patient will be at 145 mg/kg after 21 weeks of treatment     3. Angular cheilitis-ketoconazole cream and triamcinolone 0.025% cream mixed 50-50 for 2 to 3 days at a time. Reviewed proper use and side effect   4. Asteatosis-triamcinolone 0.025% cream twice daily for the next 2-3 nights-continue Aquaphor.    Patient will send a picture of a pregnancy test, then can be confirmed and have a new prescription sent yes

## 2024-05-14 ENCOUNTER — TELEPHONE (OUTPATIENT)
Dept: DERMATOLOGY | Age: 39
End: 2024-05-14

## 2024-07-09 ENCOUNTER — PROCEDURE VISIT (OUTPATIENT)
Dept: DERMATOLOGY | Age: 39
End: 2024-07-09

## 2024-07-09 ENCOUNTER — TELEPHONE (OUTPATIENT)
Dept: DERMATOLOGY | Age: 39
End: 2024-07-09

## 2024-07-09 DIAGNOSIS — L98.8 RHYTIDES: Primary | ICD-10-CM

## 2024-07-09 PROCEDURE — DM00160 COSMETIC - PR INJECTION BOTULINUM TOXIN INJ PER UNIT: Performed by: DERMATOLOGY

## 2024-07-09 RX ORDER — DROSPIRENONE AND ESTETROL 3-14.2(28)
1 KIT ORAL DAILY
COMMUNITY

## 2024-07-09 NOTE — PATIENT INSTRUCTIONS
Post-Injection Care:  Do not lie flat or turn upside down x 4 hrs after treatment.  Do not massage treated areas for 24-48 hours (avoid clarisonic-type devices).    It is OK to apply makeup and wash your face gently.  It can take 2-4 days for onset of effects.  Full effects can take up to 10-14 days.    Call in the next 1-2 weeks if there are any concerns or questions or if you think additional treatment is needed.    Bruising can occur but should not interfere with results.  Avoid aspirin, ibuprofen before future treatments.     32 bkona - 448

## 2024-07-09 NOTE — TELEPHONE ENCOUNTER
From: Saira Mueller  To: Hiwot Owens  Sent: 1/9/2023 10:45 AM CST  Subject: Physical     Hi Dr Owens   Another random request.  I had a physical done in June and my school is asking for an updated form for my clinicals([employer]).  Is there anyway you can fill out this form? I don’t have another physical scheduled until next june..  Thanks  Radha    Patient looking to schedule a skin exam with Dr. Wilkins.

## 2024-07-09 NOTE — PROGRESS NOTES
[facility] Dermatology  Neli Duarte MD  [phone]      Annmarie Gaytan  1985    39 y.o. female     Date of Visit: 7/9/2024    Last seen: Dr. Wilkins pt - last seen 2-2024    Chief Complaint: f/u s/p btx  Chief Complaint   Patient presents with    Botox Injection     History of Present Illness:    Here for trx with botox for facial wrinkles.    Bothered by glabellar 11's and horizontal FH wrinkles.    Very happy with results with previous trx - last was 2-2024.  Felt that it wore off more quickly than in the past.     Needed additional trx for the frontalis after initial trx - had started very low with units since first trx and still had some signif movement in the lateral lower FH with angled brows.    Not pregnant, not breast-feeding, no bleeding disorders and no neuromuscular disorders.    Review of Systems:  Gen: Feels well, good sense of health.      Past Medical History, Family History, Surgical History, Medications and Allergies reviewed.    Outpatient Medications Marked as Taking for the 7/9/24 encounter (Procedure visit) with Neli Duarte MD   Medication Sig Dispense Refill    NEXTSTELLIS 3-14.2 MG TABS Take 1 tablet by mouth daily      vitamin D3 (CHOLECALCIFEROL) 10 MCG (400 UNIT) TABS tablet Take 1 tablet by mouth daily      Multiple Vitamins-Minerals (THERAPEUTIC MULTIVITAMIN-MINERALS) tablet Take 1 tablet by mouth daily       No Known Allergies    History reviewed. No pertinent past medical history.  History reviewed. No pertinent surgical history.    Physical Examination     Gen, well-appearing    Baseline - similar today         After initial low dose to FH requiring add'l units to lateral FH           Assessment and Plan     1.  Wrinkles  - Discussed expectations.  Ed risks/SE including bruising, incomplete correction and asymmetry.  Pt consented to trx.  DYSPORT (dil 2.5 ml in 100 units) - changed from botox this time  Total 0.5 ml to glabella (0.09  0.09  0.15

## 2024-09-04 ENCOUNTER — OFFICE VISIT (OUTPATIENT)
Dept: DERMATOLOGY | Age: 39
End: 2024-09-04
Payer: COMMERCIAL

## 2024-09-04 DIAGNOSIS — L70.0 ACNE VULGARIS: Primary | ICD-10-CM

## 2024-09-04 DIAGNOSIS — D22.9 BENIGN NEVUS: ICD-10-CM

## 2024-09-04 PROCEDURE — 99214 OFFICE O/P EST MOD 30 MIN: CPT | Performed by: DERMATOLOGY

## 2024-09-04 RX ORDER — SPIRONOLACTONE 25 MG/1
TABLET ORAL
Qty: 180 TABLET | Refills: 3 | Status: SHIPPED | OUTPATIENT
Start: 2024-09-04

## 2024-09-04 NOTE — PROGRESS NOTES
[facility] Dermatology  Mary Ann Wilkins M.D.  [phone]       Annmarie Gaytan  1985    39 y.o. female     Date of Visit: 9/4/2024    Chief Complaint:   Chief Complaint   Patient presents with    Skin Lesion        I was asked to see this patient by Dr. Crowe ref. provider found.    History of Present Illness:  1. TBSE    Multiple nevi. Patient has not noticed any new or changing pigmented lesions.   Stable in size, shape, color. Not itching, bleeding.     Acne vulgaris-completed isotretinoin in 2021.  Clear for a year, then slow recurrence-progressive, initially a few inflammatory papules, now more constant involvement chin, jawline.  Tried an oral contraceptive for a couple of months with no improvement.    Skin History:   has had a vasectomy     Patient + oral contraceptive for the duration of her isotretinoin course.  Has been on oral contraceptives previously-no history of hypertension or blood clots         Contraception: 1.  Oral contraceptive 2.   with vasectomy     No history of depression, inflammatory bowel disease     No family history of depression or inflammatory bowel disease     Prior acne treatments: Minocycline, tretinoin, BenzaClin, clindamycin solution, Keflex     No personal history of skin cancer or dysplastic nevi.  Sister with an unknown type of skin cancer in [place]    Review of Systems:  Constitutional: Reports general sense of well-being       Past Medical History, Surgical History, Family History, Medications and Allergies reviewed.    Social History:   Social History     Socioeconomic History    Marital status:      Spouse name: Not on file    Number of children: Not on file    Years of education: Not on file    Highest education level: Not on file   Occupational History    Not on file   Tobacco Use    Smoking status: Former     Types: Cigarettes    Smokeless tobacco: Never   Vaping Use    Vaping status: Never Used   Substance and Sexual Activity    Alcohol

## 2024-10-21 ENCOUNTER — PROCEDURE VISIT (OUTPATIENT)
Dept: DERMATOLOGY | Age: 39
End: 2024-10-21

## 2024-10-21 DIAGNOSIS — L98.8 RHYTIDES: Primary | ICD-10-CM

## 2024-10-21 PROCEDURE — DM00160 COSMETIC - PR INJECTION BOTULINUM TOXIN INJ PER UNIT: Performed by: DERMATOLOGY

## 2024-10-21 NOTE — PROGRESS NOTES
[facility] Dermatology  Neli Duarte MD  [phone]      Annmarie Gaytan  1985    39 y.o. female     Date of Visit: 10/21/2024    Last seen: Dr. Wilkins pt - last seen 7-2024    Chief Complaint: btx  Chief Complaint   Patient presents with    Procedure     History of Present Illness:    Here for trx with botox for facial wrinkles.    Bothered by glabellar 11's and horizontal FH wrinkles.    Very happy with results with previous trx - last was 7-2024.  Felt that botox wore off more quickly with more recent treatments so changed to Dysport at last visit in July and felt that she liked this better.    Needed additional trx for the frontalis after initial trx - had started very low with units since first trx and still had some signif movement in the lateral lower FH with angled brows.    Not pregnant, not breast-feeding, no bleeding disorders and no neuromuscular disorders.    Review of Systems:  Gen: Feels well, good sense of health.      Past Medical History, Family History, Surgical History, Medications and Allergies reviewed.    Outpatient Medications Marked as Taking for the 10/21/24 encounter (Procedure visit) with Neli Duarte MD   Medication Sig Dispense Refill    spironolactone (ALDACTONE) 25 MG tablet 3 tabs PO  tablet 3    vitamin D3 (CHOLECALCIFEROL) 10 MCG (400 UNIT) TABS tablet Take 1 tablet by mouth daily      Multiple Vitamins-Minerals (THERAPEUTIC MULTIVITAMIN-MINERALS) tablet Take 1 tablet by mouth daily       No Known Allergies    History reviewed. No pertinent past medical history.  No past surgical history on file.    Physical Examination     Gen, well-appearing    Baseline - similar today         After initial low dose to FH requiring add'l units to lateral FH           Assessment and Plan     1.  Wrinkles  - Discussed expectations.  Ed risks/SE including bruising, incomplete correction and asymmetry.  Pt consented to trx.  DYSPORT (dil 2.5 ml in 100 units)

## 2024-10-21 NOTE — PATIENT INSTRUCTIONS
Post-Injection Care:  Do not lie flat or turn upside down x 4 hrs after treatment.  Do not massage treated areas for 24-48 hours (avoid clarisonic-type devices).    It is OK to apply makeup and wash your face gently.  It can take 2-4 days for onset of effects.  Full effects can take up to 10-14 days.    Call in the next 1-2 weeks if there are any concerns or questions or if you think additional treatment is needed.    Bruising can occur but should not interfere with results.  Avoid aspirin, ibuprofen before future treatments.     32 dkmdl - 448

## 2024-10-31 ENCOUNTER — HOSPITAL ENCOUNTER (OUTPATIENT)
Age: 39
Discharge: HOME | End: 2024-10-31
Payer: COMMERCIAL

## 2024-10-31 DIAGNOSIS — L70.0 ACNE VULGARIS: ICD-10-CM

## 2024-10-31 LAB — POTASSIUM SERPL-SCNC: 4.4 MMOL/L (ref 3.5–5.1)

## 2024-10-31 PROCEDURE — 36415 COLL VENOUS BLD VENIPUNCTURE: CPT

## 2024-10-31 PROCEDURE — 84132 ASSAY OF SERUM POTASSIUM: CPT

## 2024-12-04 ENCOUNTER — OFFICE VISIT (OUTPATIENT)
Dept: DERMATOLOGY | Age: 39
End: 2024-12-04
Payer: COMMERCIAL

## 2024-12-04 DIAGNOSIS — L70.0 ACNE VULGARIS: Primary | ICD-10-CM

## 2024-12-04 PROCEDURE — 99214 OFFICE O/P EST MOD 30 MIN: CPT | Performed by: DERMATOLOGY

## 2024-12-04 RX ORDER — SPIRONOLACTONE 25 MG/1
TABLET ORAL
Qty: 240 TABLET | Refills: 3 | Status: SHIPPED | OUTPATIENT
Start: 2024-12-04

## 2024-12-04 NOTE — PROGRESS NOTES
Vaping status: Never Used   Substance and Sexual Activity    Alcohol use: Not on file    Drug use: Not on file    Sexual activity: Not on file   Other Topics Concern    Not on file   Social History Narrative    Not on file     Social Determinants of Health     Financial Resource Strain: Not on file   Food Insecurity: Unknown (1/22/2024)    Received from [facility], [facility]    Food Insecurities     Worried about running out of food: Not on file     Food Bought: Not on file   Transportation Needs: Unknown (1/22/2024)    Received from [facility], [facility]    Transportation     Worried about transportation: Not on file   Physical Activity: Not on file   Stress: Not on file   Social Connections: Not on file   Intimate Partner Violence: Unknown (1/22/2024)    Received from [facility], [facility]    Interpersonal Safety     Feel physically or emotionally unsafe where currently live: Not on file     Harm by anyone: Not on file     Emotionally Harmed: Not on file   Housing Stability: Unknown (1/22/2024)    Received from [facility], [facility]    Housing/Utilities     Worried about losing home: Not on file     Stayed outside house: Not on file     Unable to get utilities: Not on file       Physical Examination       -General: Well-appearing, NAD  1.  4-5 small inflammatory papules around her chin and inferior cheeks-mostly periorally      Assessment and Plan     1. Acne vulgaris-increase spironolactone to 100 mg p.o. twice daily.  Recheck 2 to 3 months.  Potassium check once we have reached goal dose.  May switch her to 100 mg tabs at that time.

## 2025-02-05 ENCOUNTER — OFFICE VISIT (OUTPATIENT)
Age: 40
End: 2025-02-05
Payer: COMMERCIAL

## 2025-02-05 ENCOUNTER — TELEPHONE (OUTPATIENT)
Age: 40
End: 2025-02-05

## 2025-02-05 DIAGNOSIS — L70.0 ACNE VULGARIS: Primary | ICD-10-CM

## 2025-02-05 PROCEDURE — 99214 OFFICE O/P EST MOD 30 MIN: CPT | Performed by: DERMATOLOGY

## 2025-02-05 RX ORDER — TRETINOIN 0.5 MG/G
CREAM TOPICAL
Qty: 45 G | Refills: 4 | Status: SHIPPED | OUTPATIENT
Start: 2025-02-05

## 2025-02-05 RX ORDER — SPIRONOLACTONE 100 MG/1
100 TABLET, FILM COATED ORAL 2 TIMES DAILY
Qty: 180 TABLET | Refills: 2 | Status: SHIPPED | OUTPATIENT
Start: 2025-02-05

## 2025-02-05 NOTE — PROGRESS NOTES
[facility] Dermatology  Mary Ann Wilkins M.D.  [phone]       Annmarie Gaytan  1985    39 y.o. female     Date of Visit: 2/5/2025    Chief Complaint: No chief complaint on file.       I was asked to see this patient by Dr. Crowe ref. provider found.    History of Present Illness:  1.     History of Present Illness  The patient is a 39-year-old female presenting for follow-up for acne vulgaris.    Acne Vulgaris  - The patient reports an improvement in her acne with an increased dosage of spironolactone to 100 mg p.o. twice daily, which she has tolerated well without any adverse effects.  - She has been on spironolactone since September 2024, initially at a dosage of 75 mg twice daily  - The patient notes that her acne tends to flare during her menstrual cycle, although the severity of these flares has decreased-maybe 1 small inflammatory papule that resolves relatively quickly  - She is not currently using any topical treatments but maintains a regular moisturizing regimen.    Supplemental information: The patient expresses interest in using tretinoin (Retin-A) for cosmetics as well as scarring and acne prevention    MEDICATIONS  Current: Spironolactone  Past: Isotretinoin     Skin History:   has had a vasectomy     Patient + oral contraceptive for the duration of her isotretinoin course.  Has been on oral contraceptives previously-no history of hypertension or blood clots           Contraception: 1.  Oral contraceptive 2.   with vasectomy     No history of depression, inflammatory bowel disease     No family history of depression or inflammatory bowel disease     Prior acne treatments: Minocycline, tretinoin, BenzaClin, clindamycin solution, Keflex, completed isotretinoin in 2021     No personal history of skin cancer or dysplastic nevi.  Sister with an unknown type of skin cancer in [place]    Review of Systems:  Constitutional: Reports general sense of well-being       Past Medical History,

## 2025-03-04 ENCOUNTER — PROCEDURE VISIT (OUTPATIENT)
Age: 40
End: 2025-03-04

## 2025-03-04 DIAGNOSIS — L98.8 RHYTIDES: Primary | ICD-10-CM

## 2025-03-04 PROCEDURE — DM00160 COSMETIC - PR INJECTION BOTULINUM TOXIN INJ PER UNIT: Performed by: DERMATOLOGY

## 2025-03-04 NOTE — PATIENT INSTRUCTIONS
Post-Injection Care:  Do not lie flat or turn upside down x 4 hrs after treatment.  Do not massage treated areas for 24-48 hours (avoid clarisonic-type devices).    It is OK to apply makeup and wash your face gently.  It can take 2-4 days for onset of effects.  Full effects can take up to 10-14 days.    Call in the next 1-2 weeks if there are any concerns or questions or if you think additional treatment is needed.    Bruising can occur but should not interfere with results.  Avoid aspirin, ibuprofen before future treatments.     32 ydvgm - 448

## 2025-03-04 NOTE — PROGRESS NOTES
[facility] Dermatology  Neli Duarte MD  [phone]      Annmarie Gaytan  1985    39 y.o. female     Date of Visit: 3/4/2025    Last seen: Dr. Wilkins pt - last seen     Chief Complaint: btx  Chief Complaint   Patient presents with    Botox Injection     History of Present Illness:    Here for trx with botox for facial wrinkles.    Bothered by glabellar 11's and horizontal FH wrinkles.    Very happy with results with previous trx's - last was .  Felt that botox wore off more quickly with more recent treatments so changed to Dysport at last visit in July 2024 and felt that she liked this better.    Needed additional trx for the frontalis after initial trx - had started very low with units since first trx and still had some signif movement in the lateral lower FH with angled brows.    Not pregnant, not breast-feeding, no bleeding disorders and no neuromuscular disorders.    Review of Systems:  Gen: Feels well, good sense of health.      Past Medical History, Family History, Surgical History, Medications and Allergies reviewed.    Outpatient Medications Marked as Taking for the 3/4/25 encounter (Procedure visit) with Neli Duarte MD   Medication Sig Dispense Refill    spironolactone (ALDACTONE) 100 MG tablet Take 1 tablet by mouth 2 times daily 180 tablet 2    tretinoin (RETIN-A) 0.05 % cream Apply a pea sized amount to the face QHS 45 g 4    vitamin D3 (CHOLECALCIFEROL) 10 MCG (400 UNIT) TABS tablet Take 1 tablet by mouth daily      Multiple Vitamins-Minerals (THERAPEUTIC MULTIVITAMIN-MINERALS) tablet Take 1 tablet by mouth daily       No Known Allergies    History reviewed. No pertinent past medical history.  History reviewed. No pertinent surgical history.    Physical Examination     Gen, well-appearing    Baseline - similar today         After initial low dose to FH requiring add'l units to lateral FH           Assessment and Plan     1.  Wrinkles  - Discussed

## 2025-04-07 RX ORDER — SPIRONOLACTONE 25 MG/1
TABLET ORAL
Qty: 720 TABLET | Refills: 1 | Status: SHIPPED | OUTPATIENT
Start: 2025-04-07

## 2025-08-12 ENCOUNTER — PROCEDURE VISIT (OUTPATIENT)
Age: 40
End: 2025-08-12

## 2025-08-12 DIAGNOSIS — L98.8 RHYTIDES: Primary | ICD-10-CM

## 2025-08-12 PROCEDURE — DM00160 COSMETIC - PR INJECTION BOTULINUM TOXIN INJ PER UNIT: Performed by: DERMATOLOGY

## 2025-08-12 RX ORDER — NORETHINDRONE ACETATE AND ETHINYL ESTRADIOL AND FERROUS FUMARATE 1MG-20(21)
1 KIT ORAL DAILY
COMMUNITY
Start: 2025-06-25

## 2025-08-19 ENCOUNTER — OFFICE VISIT (OUTPATIENT)
Age: 40
End: 2025-08-19
Payer: COMMERCIAL

## 2025-08-19 DIAGNOSIS — L70.0 ACNE VULGARIS: Primary | ICD-10-CM

## 2025-08-19 DIAGNOSIS — T63.461A WASP STING, ACCIDENTAL OR UNINTENTIONAL, INITIAL ENCOUNTER: ICD-10-CM

## 2025-08-19 DIAGNOSIS — D22.9 BENIGN NEVUS: ICD-10-CM

## 2025-08-19 PROCEDURE — 99214 OFFICE O/P EST MOD 30 MIN: CPT | Performed by: DERMATOLOGY

## 2025-08-19 RX ORDER — CLOBETASOL PROPIONATE 0.5 MG/G
CREAM TOPICAL
Qty: 15 G | Refills: 0 | Status: SHIPPED | OUTPATIENT
Start: 2025-08-19

## 2025-08-19 RX ORDER — SPIRONOLACTONE 100 MG/1
100 TABLET, FILM COATED ORAL 2 TIMES DAILY
Qty: 180 TABLET | Refills: 3 | Status: SHIPPED | OUTPATIENT
Start: 2025-08-19